# Patient Record
(demographics unavailable — no encounter records)

---

## 2025-06-25 NOTE — END OF VISIT
[] : Resident [FreeTextEntry3] : New pt-  sees [facility] doctors- Endo, Cardio, Psych. She is having cardiac ablation, then will f/u here w list of meds, names of doctors so we can request records. Renew metf and farxiga, A1c 6.1 f/u 5 wks for update of HCMs and to complete the visit.

## 2025-06-25 NOTE — PHYSICAL EXAM
[Normal] : normal gait, coordination grossly intact, no focal deficits and deep tendon reflexes were 2+ and symmetric [de-identified] : Obesity [de-identified] : No wheezes present [de-identified] : Flat affect
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
## 2025-06-25 NOTE — ASSESSMENT
[Vaccines Reviewed] : Immunizations reviewed today. Please see immunization details in the vaccine log within the immunization flowsheet.  [FreeTextEntry1] : Anitra May is a 48 y.o. F PMH T2DM, Aflutter, asthma (2x uses albuterol inhaler daily), schizophrenia, presenting today after her insurance had her change PCPs. Her main concern today is weight-loss and unable to book appointment with her endocrinologist ay [facility] and running out of DM medications.   #Weight loss - Recommended cutting down on dairy from her diet - Recommended food log - Weight loss clinic referral  #Aflutter - Borderline tachycardic on vitals today - Pt follows with cardiology and is planned for ablation on July 15th  #Asthma - Pt uses rescue inhaler albuterol 2x a day - Prescribed Symbicort for daily use and albuterol for rescue only  #T2DM - Sees endocrinologist at [facility] - Reports that her A1c is 8 - Ran out of Farxiga and metformin 500mg BID - Sent refill of DM medications  #Schizophrenia - Follows with psychiatry - Last psych hospitalization in 2018 - Gets injections of Aristada - Pt says has been well-controlled on medication  #HCM - Mammo last years ago per Pt - Pap 3 years ago and was normal - Pt told to bring in all medications and immunization records next visit - Pt told to make OBGYN appt for pap and mammo - Colonoscopy referral made - Basic labs ordered today  Discussed with Dr. Christianson   RTC in 5 weeks for follow-up on her bringing in medication list, bringing in vaccine list, and if she got OBGYN appt  Michael Malhotra MD PGY-2 Internal Medicine

## 2025-06-25 NOTE — HISTORY OF PRESENT ILLNESS
[FreeTextEntry1] : Weight loss [de-identified] : Anitra May is a 48 y.o. F PMH T2DM, Aflutter, asthma (2x uses albuterol inhaler daily), schizophrenia, presenting today after her insurance had her change PCP. Her biggest concern today is her obesity. She has been trying to eat smaller portions and eating less. She has been also attempting to get more exercise by walking to stores in her location but says getting exercise is hard during the day.   For breakfast, she eats two sausages with scrambled eggs with wheat bread and occasionally a pancake. Sometimes yogurt with banana. Between breakfast and lunch, cottage cheese For lunch, eats rice and beans and chicken and vegetables Snack between lunch and dinner, fruits like pineapples Dinner she eats cornmeal or creme of wheat After dinner snack is yogurt and cottage cheese  Other than this, she had a cardiologist, endocrinologist, OBGYN, and psychiatrist a part of [facility] that she follows-up with. She has not been able to get appointments with endocrinologist and has ran out of metformin and Farxiga.